# Patient Record
Sex: MALE | URBAN - METROPOLITAN AREA
[De-identification: names, ages, dates, MRNs, and addresses within clinical notes are randomized per-mention and may not be internally consistent; named-entity substitution may affect disease eponyms.]

---

## 2021-04-27 ENCOUNTER — OFFICE VISIT (OUTPATIENT)
Dept: DERMATOLOGY | Age: 71
End: 2021-04-27
Payer: MEDICARE

## 2021-04-27 VITALS
BODY MASS INDEX: 28.71 KG/M2 | HEART RATE: 76 BPM | TEMPERATURE: 97 F | WEIGHT: 216.6 LBS | SYSTOLIC BLOOD PRESSURE: 153 MMHG | OXYGEN SATURATION: 96 % | DIASTOLIC BLOOD PRESSURE: 98 MMHG | HEIGHT: 73 IN

## 2021-04-27 DIAGNOSIS — D48.9 NEOPLASM OF UNCERTAIN BEHAVIOR: Primary | ICD-10-CM

## 2021-04-27 PROCEDURE — 11102 TANGNTL BX SKIN SINGLE LES: CPT | Performed by: DERMATOLOGY

## 2021-04-27 RX ORDER — SPIRONOLACTONE 25 MG/1
25 TABLET ORAL DAILY
COMMUNITY
Start: 2021-04-08

## 2021-04-27 RX ORDER — SILDENAFIL 100 MG/1
TABLET, FILM COATED ORAL
COMMUNITY
Start: 2020-06-29

## 2021-04-27 RX ORDER — LIDOCAINE HYDROCHLORIDE AND EPINEPHRINE 10; 10 MG/ML; UG/ML
1 INJECTION, SOLUTION INFILTRATION; PERINEURAL ONCE
Status: SHIPPED | OUTPATIENT
Start: 2021-04-27

## 2021-04-27 RX ORDER — LISINOPRIL 40 MG/1
40 TABLET ORAL DAILY
COMMUNITY
Start: 2021-04-13

## 2021-04-27 RX ORDER — AMLODIPINE BESYLATE 10 MG/1
TABLET ORAL
COMMUNITY
Start: 2021-02-25

## 2021-04-27 RX ORDER — CARVEDILOL 25 MG/1
TABLET ORAL
COMMUNITY
Start: 2021-04-16

## 2021-04-27 RX ORDER — HYDROCHLOROTHIAZIDE 25 MG/1
TABLET ORAL
COMMUNITY
Start: 2021-02-25

## 2021-04-27 SDOH — HEALTH STABILITY: MENTAL HEALTH: HOW MANY STANDARD DRINKS CONTAINING ALCOHOL DO YOU HAVE ON A TYPICAL DAY?: NOT ASKED

## 2021-04-27 SDOH — HEALTH STABILITY: MENTAL HEALTH: HOW OFTEN DO YOU HAVE A DRINK CONTAINING ALCOHOL?: NOT ASKED

## 2021-04-27 NOTE — PROGRESS NOTES
Chief Complaint   Patient presents with    New Patient     Lesion on the Rt palm. He states that it gets painful. He has had it fore 6 years. He tried Compound W     Mamillated brown papule of right palm. Ddx includes poroma, SCC, r/o melanoma    Dermatology Procedure Note   [facility]  [facility]  [address]  [address] [facility] [address]  Dept: [phone]  Dept Fax: [phone]      Procedure Date: 4/27/2021  Procedure Time: 10:24 PM    Procedure Practitioner: Ines Roy MD    Procedure: Skin Biopsy    Pre-Procedure Diagnosis: Neoplasm of Uncertain Behavior    Post-Procedure Diagnosis: Same as Pre-Procedure Diagnosis    Informed Consent: The procedure and its risks were explained including but not limited to pain, bleeding, infection, permanent scar, permanent pigment alteration and recurrence. Consent to proceed with the procedure was obtained from the patient or the parent by the practitioner    Time Out:  A time out was conducted immediately before starting the procedure that confirmed a final verification of the correct patient, correct procedure, and correct site. Procedure Details:  Shave Biopsy: The procedure and its risks were explained including but not limited to pain, bleeding, infection, permanent scar, permanent pigment alteration and need for an additional procedure. Consent to proceed with the procedure was obtained from the patient or the parent. After cleaning with alcohol the lesion was anesthetized with 1% lidocaine with epinephrine and was removed with a dermablade. Hemostasis was achieved with aluminum chloride and Vaseline and a bandage were applied.      Procedure Performed By: Ines Roy MD    Estimated Blood Loss: Minimal    Pathologic Specimen: H&E    Procedure Tolerance: Good    Complication(s): None    Electronically signed by Ines Roy MD on 4/27/21 at 10:24 PM EDT

## 2021-04-27 NOTE — PATIENT INSTRUCTIONS

## 2021-05-07 DIAGNOSIS — D23.9 ECCRINE POROMA: Primary | ICD-10-CM

## 2021-05-07 DIAGNOSIS — D48.9 NEOPLASM OF UNCERTAIN BEHAVIOR: ICD-10-CM

## 2021-05-12 ENCOUNTER — TELEPHONE (OUTPATIENT)
Dept: DERMATOLOGY | Age: 71
End: 2021-05-12

## 2021-05-12 DIAGNOSIS — D23.9 ECCRINE POROMA: Primary | ICD-10-CM

## 2021-05-12 NOTE — TELEPHONE ENCOUNTER
Dr. Nadja Boothe - I sent this to Dr. 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 Would you? These are benign but need to be excised so they don't recur. I do not do excisions on palms. Let me know your thoughts.

## 2021-05-13 NOTE — TELEPHONE ENCOUNTER
I looked at his photos I am more than happy to see hi. I believe I can excise this for him.   Thank you

## 2021-05-13 NOTE — TELEPHONE ENCOUNTER
Sending letter to pt with referral asking him to call, we have attempted to contact him multiple times and cannot reach him